# Patient Record
Sex: FEMALE | Employment: FULL TIME | URBAN - METROPOLITAN AREA
[De-identification: names, ages, dates, MRNs, and addresses within clinical notes are randomized per-mention and may not be internally consistent; named-entity substitution may affect disease eponyms.]

---

## 2019-11-19 NOTE — PROGRESS NOTES
GYNECOLOGIC  ONCOLOGY CONSULT    Referring provider:    Delia Chavez MD, MD LUKE [address] [address]   RE: Chanell Sy  : 1965  NINA: 2019    CC: Complex Ovarian Cyst    HPI: Ms Chanell Sy is a 54 year old who presents for consultation regarding complex left ovarian cyst.    Patient presented with right lower pelvic pain, ultimately attributed to musculoskeletal pain.  She underwent acupuncture as well which significantly improved the psoas pain. Today she reports her pain has resolved, no bloating, no nausea or vomiting, no diarrhea.     US:11/15/2019  Findings:     Uterus: measures 6.6 x 3.3 x 4.3 cm.  Multiple fibroids with with a   fibroid in the anterior uterus measuring 1.1 cm with mild mass effect on   the endometrial canal.  Endometrial stripe:  0.25 cm    Right ovary:  3.9 x 1.9 x 2.5 cm.   There is a 3.2 x 1.7 x 2.3 cm cyst   with and internal septation and no internal blood flow.    Left ovary:  2 x 1.9 x 2.8 cm.   There is a 2.3 x 1.9 x 2.1 cm complex   cyst in the left ovary with multiple septations.    The urinary bladder is unremarkable.    IMPRESSION  Impression:   1. 2.3 x 1.9 x 2.1 cm complex cyst in the left ovary with multiple   septations versus cluster of inclusion cysts.  2. There is a 3.2 cm mildly complex cyst in the right ovary with an   internal septation. Sonographic follow-up is recommended for both ovarian   cystic processes.  3. A few uterine fibroids, the largest measuring 1.1 cm with mild   distortion of the underlying endometrial canal.    2019 - 15.8    OBGYN history and Health Maintenance:    Last Pap Smear: 2016 Normal  Last Mammogram:2017 negative  Last Colonoscopy: 2019 Polyp, repeat in 3 years    Review of Systems:  Systemic:No weight changes.    Skin : No skin changes or new lesions.   Eye : No changes in vision.   Pulmonary: No cough or SOB.   Cardiovascular: No CP or palpitations  Gastrointestinal : No  "diarrhea, constipation, abdominal pain. Bowel habits normal.   Genitourinary: No dysuria, urgency or bleeding  Psychiatric: No depression or anxiety  Hematologic : No palpable lymph nodes.   Endocrine : No hot flashes. No heat/cold intolerance.      Neurological: No headaches, no numbness.     Past Medical History:   Diagnosis Date     HSV (herpes simplex virus) anogenital infection      Hypothyroid        Past Surgical History:   Procedure Laterality Date     C BREAST AUGMENTATION       TONSILLECTOMY           No current outpatient medications on file.         Allergies not on file    Social History:  Social History     Tobacco Use     Smoking status: Not on file   Substance Use Topics     Alcohol use: Not on file         Family History:   Family History   Problem Relation Age of Onset     Thyroid Disease Father      Heart Disease Father      Breast Cancer Sister          Physical Exam:     /78   Pulse 94   Temp 97.6  F (36.4  C) (Oral)   Resp 16   Ht 1.698 m (5' 6.85\")   Wt 60.8 kg (134 lb)   LMP  (LMP Unknown)   SpO2 98%   Breastfeeding No   BMI 21.08 kg/m    Body mass index is 21.08 kg/m .    General: Alert and oriented, no acute distress  Psych: Mood stable      Assessment:    Chanell Sy is a 54 year old woman with a new diagnosis of right ovarian with septation, no solid component.  within normal value and no family history of ovarian cancer.    I discussed option for observation versus surgical removal. The overall appearence of the ovaries in additional to normal  is low risk for ovarian cancer. I informed Chanell and her , that both benign and malignant cyst can grow. Recommend follow up ultrasound in 3 months and repeat . If there eany change in the size we will move ahead to surgery. She agrees to a plan for conservative management at this time.    She will return sooner with any new concerns.        A total of 30 minutes was spent with the patient, > 50% of time was " spent in counseling the patient and/or treatment planning.        Sofia Chatman M.D., MPH,  F.A.C.O.G.

## 2019-11-19 NOTE — TELEPHONE ENCOUNTER
ONCOLOGY INTAKE: Records Information      APPT INFORMATION: [id number] Dr. Sofia Chatman  Referring provider:  Dr. Delia Chavez  Referring provider s clinic:  [facility]  Reason for visit/diagnosis:  Bilateral complex ovarian cysts  Has patient been notified of appointment date and time?: Yes    RECORDS INFORMATION:  Were the records received with the referral (via [employer])? Yes    Has patient been seen for any external appt for this diagnosis? Yes    If yes, where? [facility]    Has patient had any imaging or procedures outside of [place] for this condition? Yes      If Yes, where? [facility]    ADDITIONAL INFORMATION:  Records forwarded to Onc Med Recs

## 2019-11-19 NOTE — TELEPHONE ENCOUNTER
RECORDS STATUS - ALL OTHER DIAGNOSIS      RECORDS RECEIVED FROM: [facility]   DATE RECEIVED: Care Everywhere   NOTES STATUS DETAILS   OFFICE NOTE from referring provider     OFFICE NOTE from medical oncologist     DISCHARGE SUMMARY from hospital     DISCHARGE REPORT from the ER     OPERATIVE REPORT     MEDICATION LIST     CLINICAL TRIAL TREATMENTS TO DATE     LABS     PATHOLOGY REPORTS     ANYTHING RELATED TO DIAGNOSIS     GENONOMIC TESTING     TYPE:     IMAGING (NEED IMAGES & REPORT)     CT SCANS     MRI     MAMMO     ULTRASOUND Requested 11/19/19  Merged to PACS 11/20/19 [facility]   PET

## 2019-11-21 ENCOUNTER — PRE VISIT (OUTPATIENT)
Dept: ONCOLOGY | Facility: CLINIC | Age: 54
End: 2019-11-21

## 2019-11-21 ENCOUNTER — ONCOLOGY VISIT (OUTPATIENT)
Dept: ONCOLOGY | Facility: CLINIC | Age: 54
End: 2019-11-21
Attending: OBSTETRICS & GYNECOLOGY

## 2019-11-21 VITALS
SYSTOLIC BLOOD PRESSURE: 128 MMHG | HEART RATE: 94 BPM | HEIGHT: 67 IN | TEMPERATURE: 97.6 F | DIASTOLIC BLOOD PRESSURE: 78 MMHG | WEIGHT: 134 LBS | OXYGEN SATURATION: 98 % | BODY MASS INDEX: 21.03 KG/M2 | RESPIRATION RATE: 16 BRPM

## 2019-11-21 DIAGNOSIS — N83.202 CYSTS OF BOTH OVARIES: Primary | ICD-10-CM

## 2019-11-21 DIAGNOSIS — N83.201 CYSTS OF BOTH OVARIES: Primary | ICD-10-CM

## 2019-11-21 PROCEDURE — 99203 OFFICE O/P NEW LOW 30 MIN: CPT | Mod: ZP | Performed by: OBSTETRICS & GYNECOLOGY

## 2019-11-21 PROCEDURE — G0463 HOSPITAL OUTPT CLINIC VISIT: HCPCS | Mod: ZF

## 2019-11-21 RX ORDER — LEVOTHYROXINE SODIUM 175 MCG
TABLET ORAL
Refills: 0 | COMMUNITY
Start: 2019-08-26

## 2019-11-21 RX ORDER — ALPRAZOLAM 0.25 MG
0.25 TABLET ORAL
COMMUNITY
Start: 2015-04-28

## 2019-11-21 ASSESSMENT — MIFFLIN-ST. JEOR: SCORE: 1243.06

## 2019-11-21 ASSESSMENT — PAIN SCALES - GENERAL: PAINLEVEL: NO PAIN (0)

## 2019-11-21 NOTE — LETTER
2019     RE: Chanell Sy  [address] [facility] [address]     Dear Colleague,    Thank you for referring your patient, Chanell Sy, to the [facility]. Please see a copy of my visit note below.    GYNECOLOGIC  ONCOLOGY CONSULT    Referring provider:    Delia Chavez MD, MD [facility]  [address] [place] [address]   RE: Chanell Sy  : 1965  NINA: 2019    CC: Complex Ovarian Cyst    HPI: Ms Chanell Sy is a 54 year old who presents for consultation regarding complex left ovarian cyst.    Patient presented with right lower pelvic pain, ultimately attributed to musculoskeletal pain.  She underwent acupuncture as well which significantly improved the psoas pain. Today she reports her pain has resolved, no bloating, no nausea or vomiting, no diarrhea.     US:11/15/2019  Findings:     Uterus: measures 6.6 x 3.3 x 4.3 cm.  Multiple fibroids with with a   fibroid in the anterior uterus measuring 1.1 cm with mild mass effect on   the endometrial canal.  Endometrial stripe:  0.25 cm    Right ovary:  3.9 x 1.9 x 2.5 cm.   There is a 3.2 x 1.7 x 2.3 cm cyst   with and internal septation and no internal blood flow.    Left ovary:  2 x 1.9 x 2.8 cm.   There is a 2.3 x 1.9 x 2.1 cm complex   cyst in the left ovary with multiple septations.    The urinary bladder is unremarkable.    IMPRESSION  Impression:   1. 2.3 x 1.9 x 2.1 cm complex cyst in the left ovary with multiple   septations versus cluster of inclusion cysts.  2. There is a 3.2 cm mildly complex cyst in the right ovary with an   internal septation. Sonographic follow-up is recommended for both ovarian   cystic processes.  3. A few uterine fibroids, the largest measuring 1.1 cm with mild   distortion of the underlying endometrial canal.    2019 - 15.8    OBGYN history and Health Maintenance:    Last Pap Smear: 2016 Normal  Last Mammogram:2017 negative  Last Colonoscopy: 2019 Polyp, repeat  "in 3 years    Review of Systems:  Systemic:No weight changes.    Skin : No skin changes or new lesions.   Eye : No changes in vision.   Pulmonary: No cough or SOB.   Cardiovascular: No CP or palpitations  Gastrointestinal : No diarrhea, constipation, abdominal pain. Bowel habits normal.   Genitourinary: No dysuria, urgency or bleeding  Psychiatric: No depression or anxiety  Hematologic : No palpable lymph nodes.   Endocrine : No hot flashes. No heat/cold intolerance.      Neurological: No headaches, no numbness.     Past Medical History:   Diagnosis Date     HSV (herpes simplex virus) anogenital infection      Hypothyroid        Past Surgical History:   Procedure Laterality Date     C BREAST AUGMENTATION       TONSILLECTOMY           No current outpatient medications on file.         Allergies not on file    Social History:  Social History     Tobacco Use     Smoking status: Not on file   Substance Use Topics     Alcohol use: Not on file         Family History:   Family History   Problem Relation Age of Onset     Thyroid Disease Father      Heart Disease Father      Breast Cancer Sister          Physical Exam:     /78   Pulse 94   Temp 97.6  F (36.4  C) (Oral)   Resp 16   Ht 1.698 m (5' 6.85\")   Wt 60.8 kg (134 lb)   LMP  (LMP Unknown)   SpO2 98%   Breastfeeding No   BMI 21.08 kg/m     Body mass index is 21.08 kg/m .    General: Alert and oriented, no acute distress  Psych: Mood stable      Assessment:    Chanell Sy is a 54 year old woman with a new diagnosis of right ovarian with septation, no solid component.  within normal value and no family history of ovarian cancer.    I discussed option for observation versus surgical removal. The overall appearence of the ovaries in additional to normal  is low risk for ovarian cancer. I informed Chanell and her , that both benign and malignant cyst can grow. Recommend follow up ultrasound in 3 months and repeat . If there eany change in " the size we will move ahead to surgery. She agrees to a plan for conservative management at this time.    She will return sooner with any new concerns.    A total of 30 minutes was spent with the patient, > 50% of time was spent in counseling the patient and/or treatment planning.      Sofia Chatman M.D., MPH,  F.A.C.O.G.

## 2019-11-21 NOTE — NURSING NOTE
"Oncology Rooming Note    November 21, 2019 4:18 PM   Chanell Sy is a 54 year old female who presents for:    Chief Complaint   Patient presents with     Oncology Clinic Visit     New; Bilateral Complex Ovarian Cysts     Initial Vitals: /78   Pulse 94   Temp 97.6  F (36.4  C) (Oral)   Resp 16   Ht 1.698 m (5' 6.85\")   Wt 60.8 kg (134 lb)   LMP  (LMP Unknown)   SpO2 98%   Breastfeeding No   BMI 21.08 kg/m   Estimated body mass index is 21.08 kg/m  as calculated from the following:    Height as of this encounter: 1.698 m (5' 6.85\").    Weight as of this encounter: 60.8 kg (134 lb). Body surface area is 1.69 meters squared.  No Pain (0) Comment: Data Unavailable   No LMP recorded (lmp unknown). Patient is postmenopausal.  Allergies reviewed: Yes  Medications reviewed: Yes    Medications: Medication refills not needed today.  Pharmacy name entered into [facility]: [facility] [place], [place] - [address] [place], [place] [address]    Clinical concerns: Bilateral Complex Ovarian Cysts       Corinne Phillips, LANDON              "

## 2020-01-20 DIAGNOSIS — N83.201 CYSTS OF BOTH OVARIES: Primary | ICD-10-CM

## 2020-01-20 DIAGNOSIS — N83.202 CYSTS OF BOTH OVARIES: Primary | ICD-10-CM

## 2020-02-05 NOTE — PROGRESS NOTES
GYNECOLOGIC  ONCOLOGY CLINIC NOTE    Referring provider:    Delia Chavez MD, MD [facility] [address] [address]   RE: Chanell Sy  : 1965  NINA: 2020    CC: Complex Ovarian Cyst    HPI: Ms Chanell Sy is a 54 year old who presents for follow up of complex left ovarian cyst.    At her initial consult in 2019 she opted for conservative management and follow up with repeat pelvic US was performed today.  Today she reports feeling well. She thinks part of her hip pain is back although it had previously resolved with acupuncture.    Treatment History   Patient presented with right lower pelvic pain, ultimately attributed to musculoskeletal pain.  She underwent acupuncture as well which significantly improved the psoas pain. Today she reports her pain has resolved, no bloating, no nausea or vomiting, no diarrhea.     US:11/15/2019  Findings:     Uterus: measures 6.6 x 3.3 x 4.3 cm.  Multiple fibroids with with a   fibroid in the anterior uterus measuring 1.1 cm with mild mass effect on   the endometrial canal.  Endometrial stripe:  0.25 cm    Right ovary:  3.9 x 1.9 x 2.5 cm.   There is a 3.2 x 1.7 x 2.3 cm cyst   with and internal septation and no internal blood flow.    Left ovary:  2 x 1.9 x 2.8 cm.   There is a 2.3 x 1.9 x 2.1 cm complex   cyst in the left ovary with multiple septations.    The urinary bladder is unremarkable.    IMPRESSION  Impression:   1. 2.3 x 1.9 x 2.1 cm complex cyst in the left ovary with multiple   septations versus cluster of inclusion cysts.  2. There is a 3.2 cm mildly complex cyst in the right ovary with an   internal septation. Sonographic follow-up is recommended for both ovarian   cystic processes.  3. A few uterine fibroids, the largest measuring 1.1 cm with mild   distortion of the underlying endometrial canal.    2019 - 15.8    2020 Pelvic US    Preliminary: Right 2.3 cm unilocular, Left 1.3 unilocular cyst (fnal read  "pending)      OBGYN history and Health Maintenance:    Last Pap Smear: 2016 Normal  Last Mammogram: 2017 negative  Last Colonoscopy: 2019 Polyp, repeat in 3 years    Review of Systems:  Systemic:No weight changes.    Skin : No skin changes or new lesions.   Eye : No changes in vision.   Pulmonary: No cough or SOB.   Cardiovascular: No CP or palpitations  Gastrointestinal : No diarrhea, constipation, abdominal pain. Bowel habits normal.   Genitourinary: No dysuria, urgency or bleeding  Psychiatric: No depression or anxiety  Hematologic : No palpable lymph nodes.   Endocrine : No hot flashes. No heat/cold intolerance.      Neurological: No headaches, no numbness.     Past Medical History:   Diagnosis Date     HSV (herpes simplex virus) anogenital infection      Hypothyroid        Past Surgical History:   Procedure Laterality Date     C BREAST AUGMENTATION       TONSILLECTOMY           Current Outpatient Medications   Medication Sig Dispense Refill     ALPRAZolam (XANAX) 0.25 MG tablet Take 0.25 mg by mouth       SYNTHROID 175 MCG tablet 1 TAB 6 DAYS PER WEEK. 1/2  ON SUNDAYS  0         Allergies   Allergen Reactions     Cephalexin Hives       Social History:  Social History     Tobacco Use     Smoking status: Former Smoker     Smokeless tobacco: Never Used   Substance Use Topics     Alcohol use: Yes     Comment: socially         Family History:   Family History   Problem Relation Age of Onset     Thyroid Disease Father      Heart Disease Father      Breast Cancer Sister         Age 55, on HRT         Physical Exam:     /73 (BP Location: Right arm, Patient Position: Chair, Cuff Size: Adult Regular)   Pulse 73   Temp 97.5  F (36.4  C) (Oral)   Resp 14   Ht 1.698 m (5' 6.85\")   Wt 63.3 kg (139 lb 9.6 oz)   LMP  (LMP Unknown)   SpO2 99%   BMI 21.96 kg/m    There is no height or weight on file to calculate BMI.    General: Alert and oriented, no acute distress  Psych: Mood stable    Assessment: "     Chanell Sy is a 54 year old woman with a stable and likely decreasing bilateral ovarian cyst of low risk appearance.    I reviewed today's imaging with Chanell and will share result of  when it becomes available.    Overall Chanell prefers not to remove her ovaries and agrees with continued plan of follow up.     Plan for pelvic US in 6 months with her primary Gyn at [facility].        Sofia Chatman M.D., MPH,  F.A.C.O.G.        A total of 20 minutes was spent with the patient, > 50% of time was spent in counseling the patient and/or treatment planning.

## 2020-02-06 ENCOUNTER — ONCOLOGY VISIT (OUTPATIENT)
Dept: ONCOLOGY | Facility: CLINIC | Age: 55
End: 2020-02-06
Attending: OBSTETRICS & GYNECOLOGY

## 2020-02-06 ENCOUNTER — ANCILLARY PROCEDURE (OUTPATIENT)
Dept: ULTRASOUND IMAGING | Facility: CLINIC | Age: 55
End: 2020-02-06
Attending: OBSTETRICS & GYNECOLOGY

## 2020-02-06 VITALS
HEART RATE: 73 BPM | BODY MASS INDEX: 21.91 KG/M2 | DIASTOLIC BLOOD PRESSURE: 73 MMHG | OXYGEN SATURATION: 99 % | HEIGHT: 67 IN | TEMPERATURE: 97.5 F | WEIGHT: 139.6 LBS | SYSTOLIC BLOOD PRESSURE: 106 MMHG | RESPIRATION RATE: 14 BRPM

## 2020-02-06 DIAGNOSIS — N83.201 CYSTS OF BOTH OVARIES: ICD-10-CM

## 2020-02-06 DIAGNOSIS — N83.202 CYST OF LEFT OVARY: Primary | ICD-10-CM

## 2020-02-06 DIAGNOSIS — N83.202 CYSTS OF BOTH OVARIES: ICD-10-CM

## 2020-02-06 LAB — CANCER AG125 SERPL-ACNC: 12 U/ML (ref 0–30)

## 2020-02-06 PROCEDURE — G0463 HOSPITAL OUTPT CLINIC VISIT: HCPCS | Mod: ZF

## 2020-02-06 PROCEDURE — 36415 COLL VENOUS BLD VENIPUNCTURE: CPT | Performed by: OBSTETRICS & GYNECOLOGY

## 2020-02-06 PROCEDURE — 99213 OFFICE O/P EST LOW 20 MIN: CPT | Mod: ZP | Performed by: OBSTETRICS & GYNECOLOGY

## 2020-02-06 PROCEDURE — 86304 IMMUNOASSAY TUMOR CA 125: CPT | Performed by: OBSTETRICS & GYNECOLOGY

## 2020-02-06 ASSESSMENT — MIFFLIN-ST. JEOR: SCORE: 1268.46

## 2020-02-06 ASSESSMENT — PAIN SCALES - GENERAL: PAINLEVEL: NO PAIN (0)

## 2020-02-06 NOTE — LETTER
2020     RE: Chanell Sy  [address] [facility] [address]     Dear Colleague,    Thank you for referring your patient, Chanell Sy, to the [facility]. Please see a copy of my visit note below.    GYNECOLOGIC  ONCOLOGY CLINIC NOTE    Referring provider:    Delia Chavez MD, MD [facility]  [address] [place] [address]   RE: Chanell Sy  : 1965  NINA: 2020    CC: Complex Ovarian Cyst    HPI: Ms Chanell Sy is a 54 year old who presents for follow up of complex left ovarian cyst.    At her initial consult in 2019 she opted for conservative management and follow up with repeat pelvic US was performed today.  Today she reports feeling well. She thinks part of her hip pain is back although it had previously resolved with acupuncture.    Treatment History   Patient presented with right lower pelvic pain, ultimately attributed to musculoskeletal pain.  She underwent acupuncture as well which significantly improved the psoas pain. Today she reports her pain has resolved, no bloating, no nausea or vomiting, no diarrhea.     US:11/15/2019  Findings:     Uterus: measures 6.6 x 3.3 x 4.3 cm.  Multiple fibroids with with a   fibroid in the anterior uterus measuring 1.1 cm with mild mass effect on   the endometrial canal.  Endometrial stripe:  0.25 cm    Right ovary:  3.9 x 1.9 x 2.5 cm.   There is a 3.2 x 1.7 x 2.3 cm cyst   with and internal septation and no internal blood flow.    Left ovary:  2 x 1.9 x 2.8 cm.   There is a 2.3 x 1.9 x 2.1 cm complex   cyst in the left ovary with multiple septations.    The urinary bladder is unremarkable.    IMPRESSION  Impression:   1. 2.3 x 1.9 x 2.1 cm complex cyst in the left ovary with multiple   septations versus cluster of inclusion cysts.  2. There is a 3.2 cm mildly complex cyst in the right ovary with an   internal septation. Sonographic follow-up is recommended for both ovarian   cystic processes.  3. A few uterine  "fibroids, the largest measuring 1.1 cm with mild   distortion of the underlying endometrial canal.    11/18/2019 - 15.8    2/6/2020 Pelvic US    Preliminary: Right 2.3 cm unilocular, Left 1.3 unilocular cyst (fnal read pending)      OBGYN history and Health Maintenance:    Last Pap Smear: 2016 Normal  Last Mammogram: 2017 negative  Last Colonoscopy: 2019 Polyp, repeat in 3 years    Review of Systems:  Systemic:No weight changes.    Skin : No skin changes or new lesions.   Eye : No changes in vision.   Pulmonary: No cough or SOB.   Cardiovascular: No CP or palpitations  Gastrointestinal : No diarrhea, constipation, abdominal pain. Bowel habits normal.   Genitourinary: No dysuria, urgency or bleeding  Psychiatric: No depression or anxiety  Hematologic : No palpable lymph nodes.   Endocrine : No hot flashes. No heat/cold intolerance.      Neurological: No headaches, no numbness.     Past Medical History:   Diagnosis Date     HSV (herpes simplex virus) anogenital infection      Hypothyroid        Past Surgical History:   Procedure Laterality Date     C BREAST AUGMENTATION       TONSILLECTOMY           Current Outpatient Medications   Medication Sig Dispense Refill     ALPRAZolam (XANAX) 0.25 MG tablet Take 0.25 mg by mouth       SYNTHROID 175 MCG tablet 1 TAB 6 DAYS PER WEEK. 1/2  ON SUNDAYS  0         Allergies   Allergen Reactions     Cephalexin Hives       Social History:  Social History     Tobacco Use     Smoking status: Former Smoker     Smokeless tobacco: Never Used   Substance Use Topics     Alcohol use: Yes     Comment: socially         Family History:   Family History   Problem Relation Age of Onset     Thyroid Disease Father      Heart Disease Father      Breast Cancer Sister         Age 55, on HRT         Physical Exam:     /73 (BP Location: Right arm, Patient Position: Chair, Cuff Size: Adult Regular)   Pulse 73   Temp 97.5  F (36.4  C) (Oral)   Resp 14   Ht 1.698 m (5' 6.85\")   " Wt 63.3 kg (139 lb 9.6 oz)   LMP  (LMP Unknown)   SpO2 99%   BMI 21.96 kg/m     There is no height or weight on file to calculate BMI.    General: Alert and oriented, no acute distress  Psych: Mood stable    Assessment:     Chanell Sy is a 54 year old woman with a stable and likely decreasing bilateral ovarian cyst of low risk appearance.    I reviewed today's imaging with Chanell and will share result of  when it becomes available.    Overall Chanell prefers not to remove her ovaries and agrees with continued plan of follow up.     Plan for pelvic US in 6 months with her primary Gyn at [facility].        Sofia Chatman M.D., MPH,  F.A.C.O.G.        A total of 20 minutes was spent with the patient, > 50% of time was spent in counseling the patient and/or treatment planning.

## 2020-02-06 NOTE — NURSING NOTE
"Oncology Rooming Note    February 6, 2020 9:56 AM   Chanell Sy is a 54 year old female who presents for:    Chief Complaint   Patient presents with     Oncology Clinic Visit     Returns; Colon Polyps     Initial Vitals: /73 (BP Location: Right arm, Patient Position: Chair, Cuff Size: Adult Regular)   Pulse 73   Temp 97.5  F (36.4  C) (Oral)   Resp 14   Ht 1.698 m (5' 6.85\")   Wt 63.3 kg (139 lb 9.6 oz)   LMP  (LMP Unknown)   SpO2 99%   BMI 21.96 kg/m   Estimated body mass index is 21.96 kg/m  as calculated from the following:    Height as of this encounter: 1.698 m (5' 6.85\").    Weight as of this encounter: 63.3 kg (139 lb 9.6 oz). Body surface area is 1.73 meters squared.  No Pain (0) Comment: Data Unavailable   No LMP recorded (lmp unknown). Patient is postmenopausal.  Allergies reviewed: Yes  Medications reviewed: Yes    Medications: Medication refills not needed today.  Pharmacy name entered into [facility]: [id number] - [place], [place] - [address] [place] [address] [address]    Clinical concerns: No new concerns.        Samantha Bokoer [facility]              "

## 2020-03-11 ENCOUNTER — HEALTH MAINTENANCE LETTER (OUTPATIENT)
Age: 55
End: 2020-03-11

## 2021-01-04 ENCOUNTER — HEALTH MAINTENANCE LETTER (OUTPATIENT)
Age: 56
End: 2021-01-04

## 2021-04-25 ENCOUNTER — HEALTH MAINTENANCE LETTER (OUTPATIENT)
Age: 56
End: 2021-04-25

## 2021-10-10 ENCOUNTER — HEALTH MAINTENANCE LETTER (OUTPATIENT)
Age: 56
End: 2021-10-10

## 2022-05-22 ENCOUNTER — HEALTH MAINTENANCE LETTER (OUTPATIENT)
Age: 57
End: 2022-05-22

## 2022-09-18 ENCOUNTER — HEALTH MAINTENANCE LETTER (OUTPATIENT)
Age: 57
End: 2022-09-18

## 2023-06-04 ENCOUNTER — HEALTH MAINTENANCE LETTER (OUTPATIENT)
Age: 58
End: 2023-06-04

## 2023-12-17 ENCOUNTER — HEALTH MAINTENANCE LETTER (OUTPATIENT)
Age: 58
End: 2023-12-17